# Patient Record
Sex: FEMALE | URBAN - METROPOLITAN AREA
[De-identification: names, ages, dates, MRNs, and addresses within clinical notes are randomized per-mention and may not be internally consistent; named-entity substitution may affect disease eponyms.]

---

## 2019-03-09 ENCOUNTER — EMERGENCY (EMERGENCY)
Facility: HOSPITAL | Age: 24
LOS: 1 days | End: 2019-03-09
Attending: EMERGENCY MEDICINE

## 2019-03-09 VITALS
RESPIRATION RATE: 18 BRPM | OXYGEN SATURATION: 98 % | TEMPERATURE: 98 F | HEART RATE: 90 BPM | DIASTOLIC BLOOD PRESSURE: 78 MMHG | HEIGHT: 65 IN | SYSTOLIC BLOOD PRESSURE: 122 MMHG | WEIGHT: 149.91 LBS

## 2019-03-09 NOTE — ED ADULT NURSE REASSESSMENT NOTE - NS ED NURSE REASSESS COMMENT FT1
pt ambulatory out of ED with mom and dad prior to DC. pt ambulatory out of ED with mom and dad, ER MD aware.

## 2019-03-09 NOTE — ED ADULT NURSE NOTE - NSIMPLEMENTINTERV_GEN_ALL_ED
Implemented All Universal Safety Interventions:  [place] to call system. Call bell, personal items and telephone within reach. Instruct patient to call for assistance. Room bathroom lighting operational. Non-slip footwear when patient is off stretcher. Physically safe environment: no spills, clutter or unnecessary equipment. Stretcher in lowest position, wheels locked, appropriate side rails in place.

## 2019-03-09 NOTE — ED ADULT NURSE NOTE - OBJECTIVE STATEMENT
pt with reports of drinking too much, boyfriend called 911 because she was lying on the street. no injuries, ambulatory in ED and able to maintain steady gait. even and unlabored resps, ER MD brought ot bedside, parents reports they would like ot take her home.

## 2019-03-09 NOTE — ED ADULT TRIAGE NOTE - CHIEF COMPLAINT QUOTE
pt BIBA for reports of alcohol intoxication. pt with no injury. mom at bedside wants to take patient home. pt able to ambulate off EMS stretcher.

## 2023-05-31 ENCOUNTER — APPOINTMENT (OUTPATIENT)
Dept: OBGYN | Facility: CLINIC | Age: 28
End: 2023-05-31
Payer: COMMERCIAL

## 2023-05-31 VITALS
HEART RATE: 76 BPM | HEIGHT: 61 IN | DIASTOLIC BLOOD PRESSURE: 87 MMHG | WEIGHT: 126 LBS | BODY MASS INDEX: 23.79 KG/M2 | SYSTOLIC BLOOD PRESSURE: 132 MMHG | OXYGEN SATURATION: 100 %

## 2023-05-31 DIAGNOSIS — N80.9 ENDOMETRIOSIS, UNSPECIFIED: ICD-10-CM

## 2023-05-31 PROBLEM — Z00.00 ENCOUNTER FOR PREVENTIVE HEALTH EXAMINATION: Status: ACTIVE | Noted: 2023-05-31

## 2023-05-31 PROCEDURE — 99203 OFFICE O/P NEW LOW 30 MIN: CPT

## 2023-05-31 NOTE — PLAN
[FreeTextEntry1] : 27 YO Pt here for CON, second opinion regarding endometriosis/PCOS\par \par discussed goals of care at length for patient, states she wants to get pregnant\par 1. Infertility\par -rec to start PNV, OPKS, times intercourse, partner to get SA\par -[facility]\par -[facility] referral given\par 2. Endometriosis\par -discussed medical and surgical management at length\par 3. PCOS\par -US pelvis\par -reviewed lab work (no lab indication of PCOS), discussed PCOS/infertility\par \par spent approx 40 mins with patient and mother\par to follow up after ultrasound\par Sherie Guerrero MD

## 2023-05-31 NOTE — HISTORY OF PRESENT ILLNESS
[FreeTextEntry1] : 29 YO P0 Pt here for CON for PCOS and Endometriosis. Pt has been diagnosed with PCOS since she was 14. \par States was placed on an OCP at 14 to start her menses, went off of in in about 2 years and had regular periods. States that they were regular till January when she had heavy bleeding and went into the ED. At the time she said that she had an ultrasound that was normal. \par Said that she saw another GYN who told her she has endometriosis and needs laparoscopy.\par \par Pt states that her periods since Jan have been regular, 2 days needs Motrin, but overall normal since Jan. \par \par Has been trying to get pregnant for 2 years, hasn’t started OPKS, not taking PNV, hasn’t seen an [facility]. States her  needs a SA done.  has no other children. \par \par Here for a second opinion. \par \par \par -Pt allergic to Penicillin (Hives)

## 2023-05-31 NOTE — PHYSICAL EXAM
[Appropriately responsive] : appropriately responsive [Alert] : alert [No Acute Distress] : no acute distress [No Lymphadenopathy] : no lymphadenopathy [Regular Rate Rhythm] : regular rate rhythm [No Murmurs] : no murmurs [Clear to Auscultation B/L] : clear to auscultation bilaterally [Soft] : soft [Non-tender] : non-tender [Non-distended] : non-distended [No HSM] : No HSM [No Lesions] : no lesions [No Mass] : no mass [Oriented x3] : oriented x3 [FreeTextEntry2] : no male pattern hair growth

## 2023-06-05 ENCOUNTER — OUTPATIENT (OUTPATIENT)
Dept: OUTPATIENT SERVICES | Facility: HOSPITAL | Age: 28
LOS: 1 days | End: 2023-06-05

## 2023-06-05 ENCOUNTER — TRANSCRIPTION ENCOUNTER (OUTPATIENT)
Age: 28
End: 2023-06-05

## 2023-06-05 ENCOUNTER — APPOINTMENT (OUTPATIENT)
Dept: ULTRASOUND IMAGING | Facility: CLINIC | Age: 28
End: 2023-06-05
Payer: COMMERCIAL

## 2023-06-05 DIAGNOSIS — N80.9 ENDOMETRIOSIS, UNSPECIFIED: ICD-10-CM

## 2023-06-05 PROCEDURE — 76856 US EXAM PELVIC COMPLETE: CPT | Mod: 26

## 2023-06-05 PROCEDURE — 76830 TRANSVAGINAL US NON-OB: CPT | Mod: 26
